# Patient Record
Sex: FEMALE | Race: BLACK OR AFRICAN AMERICAN | NOT HISPANIC OR LATINO | Employment: FULL TIME | URBAN - METROPOLITAN AREA
[De-identification: names, ages, dates, MRNs, and addresses within clinical notes are randomized per-mention and may not be internally consistent; named-entity substitution may affect disease eponyms.]

---

## 2025-05-21 ENCOUNTER — OFFICE VISIT (OUTPATIENT)
Dept: URGENT CARE | Age: 65
End: 2025-05-21
Payer: COMMERCIAL

## 2025-05-21 ENCOUNTER — ANCILLARY PROCEDURE (OUTPATIENT)
Dept: URGENT CARE | Age: 65
End: 2025-05-21
Payer: COMMERCIAL

## 2025-05-21 VITALS
HEIGHT: 64 IN | RESPIRATION RATE: 19 BRPM | WEIGHT: 177 LBS | OXYGEN SATURATION: 97 % | HEART RATE: 91 BPM | DIASTOLIC BLOOD PRESSURE: 78 MMHG | BODY MASS INDEX: 30.22 KG/M2 | TEMPERATURE: 97.3 F | SYSTOLIC BLOOD PRESSURE: 138 MMHG

## 2025-05-21 DIAGNOSIS — R52 PAIN: ICD-10-CM

## 2025-05-21 DIAGNOSIS — S83.92XA SPRAIN OF LEFT KNEE, UNSPECIFIED LIGAMENT, INITIAL ENCOUNTER: Primary | ICD-10-CM

## 2025-05-21 RX ORDER — ATORVASTATIN CALCIUM 20 MG/1
20 TABLET, FILM COATED ORAL
COMMUNITY
Start: 2025-01-10 | End: 2025-07-09

## 2025-05-21 RX ORDER — NAPROXEN 500 MG/1
500 TABLET ORAL
Qty: 20 TABLET | Refills: 0 | Status: SHIPPED | OUTPATIENT
Start: 2025-05-21 | End: 2026-05-21

## 2025-05-21 RX ORDER — LEVOTHYROXINE SODIUM 88 UG/1
88 TABLET ORAL
COMMUNITY
Start: 2025-04-10 | End: 2025-10-07

## 2025-05-21 ASSESSMENT — PAIN SCALES - GENERAL: PAINLEVEL_OUTOF10: 7

## 2025-05-21 NOTE — PROGRESS NOTES
"Subjective   Patient ID: Rivka Cosby is a 64 y.o. female who presents for Knee Pain (Left Knee got caught in a revolving door today).  History of Present Illness  Rivka Cosby is a 64 year old female with diabetes who presents with knee pain after an injury.    Knee pain began after an incident where her knee was hit by a revolving door earlier today at 1:15 PM. The pain is severe, rated 7 out of 10 at rest and nearly 10 out of 10 when walking. She has not taken any pain medication today as she wanted to assess her condition. She has previously used naproxen and Motrin 600 mg for pain management but has not taken any today. She is currently not on any medication for her knee pain.    No other symptoms beyond knee pain and swelling.    ROS is negative unless otherwise stated in HPI.       Objective     /78   Pulse 91   Temp 36.3 °C (97.3 °F)   Resp 19   Ht 1.626 m (5' 4\")   Wt 80.3 kg (177 lb)   LMP  (Exact Date)   SpO2 97%   BMI 30.38 kg/m²        VS: As documented in the triage note and EMR flowsheet from this visit was reviewed  General: Well appearing. No acute distress.   Eyes:  Extraocular movements grossly intact. No scleral icterus.   Head: Atraumatic. Normocephalic.     Neck: No meningismus. No gross masses. Full movement through range of motion  CV: Regular rhythm. No murmurs, rubs, gallops appreciated.   Resp: Clear to auscultation bilaterally. No respiratory distress.    MSK: Symmetric muscle bulk. No gross step offs or deformities. Mild tenderness and edema to the patella of the left knee.   Skin: Warm, dry. No rashes  Neuro: CN II-VII intact. A&O x3. Speech fluent. Alert. Moving all extremities. Ambulates with normal gait  Psych: Appropriate mood and affect for situation    Point of Care Test & Imaging Results from this visit  No results found for this visit on 05/21/25.   Imaging  XR knee left 4+ views  Result Date: 5/21/2025  No acute osseous findings.     MACRO: None   Signed " by: Grant Anthony 5/21/2025 6:39 PM Dictation workstation:   [id number]      Cardiology, Vascular, and Other Imaging  No other imaging results found for the past 2 days      Diagnostic study results (if any) were reviewed by Anu Galindo PA-C.    Assessment/Plan   Allergies, medications, history, and pertinent labs/EKGs/Imaging reviewed by Anu Galindo PA-C.     Assessment & Plan  Patient is a 64-year-old female who presents with acute onset knee pain and swelling after being hit with a revolving door.  On examination, patient ambulatory without difficulty.  Vitals are stable.  Mild edema and tenderness noted to the left patella.  No joint laxity.  Ace bandage is applied for stability and to reduce swelling. Naproxen is prescribed for pain and inflammation. Completed worker's compensation paperwork.      Orders and Diagnoses  Diagnoses and all orders for this visit:  Sprain of left knee, unspecified ligament, initial encounter  -     naproxen (Naprosyn) 500 mg tablet; Take 1 tablet (500 mg) by mouth 2 times daily (morning and late afternoon).  Pain  -     XR knee left 4+ views; Future        Disposition:  Home      Anu Galindo PA-C     This medical note was created with the assistance of artificial intelligence (AI) for documentation purposes. The content has been reviewed and confirmed by the healthcare provider for accuracy and completeness. Patient consented to the use of audio recording and use of AI during their visit.

## 2025-07-21 ENCOUNTER — HOSPITAL ENCOUNTER (OUTPATIENT)
Dept: RADIOLOGY | Facility: HOSPITAL | Age: 65
Discharge: HOME | End: 2025-07-21
Payer: MEDICARE

## 2025-07-21 DIAGNOSIS — R10.32 LEFT LOWER QUADRANT PAIN: ICD-10-CM

## 2025-07-21 DIAGNOSIS — R14.0 ABDOMINAL DISTENSION (GASEOUS): ICD-10-CM

## 2025-07-21 PROCEDURE — 74176 CT ABD & PELVIS W/O CONTRAST: CPT | Performed by: RADIOLOGY

## 2025-07-21 PROCEDURE — 74176 CT ABD & PELVIS W/O CONTRAST: CPT
